# Patient Record
Sex: FEMALE | Race: BLACK OR AFRICAN AMERICAN | NOT HISPANIC OR LATINO | URBAN - METROPOLITAN AREA
[De-identification: names, ages, dates, MRNs, and addresses within clinical notes are randomized per-mention and may not be internally consistent; named-entity substitution may affect disease eponyms.]

---

## 2023-10-20 ENCOUNTER — APPOINTMENT (OUTPATIENT)
Age: 38
Setting detail: DERMATOLOGY
End: 2023-10-20

## 2023-10-20 DIAGNOSIS — L73.2 HIDRADENITIS SUPPURATIVA: ICD-10-CM

## 2023-10-20 DIAGNOSIS — L81.0 POSTINFLAMMATORY HYPERPIGMENTATION: ICD-10-CM

## 2023-10-20 DIAGNOSIS — Z71.89 OTHER SPECIFIED COUNSELING: ICD-10-CM

## 2023-10-20 PROCEDURE — 99203 OFFICE O/P NEW LOW 30 MIN: CPT

## 2023-10-20 PROCEDURE — OTHER MIPS QUALITY: OTHER

## 2023-10-20 PROCEDURE — OTHER COUNSELING: OTHER

## 2023-10-20 PROCEDURE — OTHER PRESCRIPTION: OTHER

## 2023-10-20 PROCEDURE — OTHER PRESCRIPTION MEDICATION MANAGEMENT: OTHER

## 2023-10-20 RX ORDER — CLINDAMYCIN PHOSPHATE 10 MG/G
GEL TOPICAL QD
Qty: 60 | Refills: 0 | Status: ERX | COMMUNITY
Start: 2023-10-20

## 2023-10-20 RX ORDER — SULFACETAMIDE SODIUM AND SULFUR 90; 45 MG/G; MG/G
CREAM TOPICAL
Qty: 454 | Refills: 0 | Status: ERX | COMMUNITY
Start: 2023-10-20

## 2023-10-20 ASSESSMENT — LOCATION ZONE DERM: LOCATION ZONE: LEG

## 2023-10-20 ASSESSMENT — LOCATION DETAILED DESCRIPTION DERM
LOCATION DETAILED: RIGHT ANTERIOR PROXIMAL THIGH
LOCATION DETAILED: LEFT ANTERIOR PROXIMAL THIGH
LOCATION DETAILED: LEFT ANTERIOR MEDIAL PROXIMAL THIGH

## 2023-10-20 ASSESSMENT — LOCATION SIMPLE DESCRIPTION DERM
LOCATION SIMPLE: LEFT THIGH
LOCATION SIMPLE: RIGHT THIGH

## 2023-10-20 NOTE — PROCEDURE: MIPS QUALITY
Detail Level: Detailed
Quality 130: Documentation Of Current Medications In The Medical Record: Current Medications Documented
Quality 110: Preventive Care And Screening: Influenza Immunization: Influenza Immunization Ordered or Recommended, but not Administered due to system reason
Quality 226: Preventive Care And Screening: Tobacco Use: Screening And Cessation Intervention: Tobacco Screening not Performed

## 2023-10-20 NOTE — PROCEDURE: PRESCRIPTION MEDICATION MANAGEMENT
Render In Strict Bullet Format?: No
Initiate Treatment: - Sumadan 9 %-4.5 % topical cleanser : Apply to affected areas on body for 10 minutes daily then rinse.\\n- Clindamycin 1 % topical gel: Apply a thin layer to affected areas on groin and body QD PRN flares
Detail Level: Zone

## 2023-10-20 NOTE — PROCEDURE: COUNSELING
Sunscreen Recommendation Label Override: Broad Spectrum Sunscreen SPF 40-50
Sunscreen Recommendations: creams NOT sprays. Reapply frequently as stated on bottle.
Detail Level: Detailed
Detail Level: Zone
Detail Level: Generalized
Sunscreen Recommendation Label Override: Sunscreen SPF 40-50